# Patient Record
(demographics unavailable — no encounter records)

---

## 2024-12-05 NOTE — DISCUSSION/SUMMARY
[de-identified] : 55-year-old gentleman with bilateral proximal tibia and fibula fractures, approximately 6 months out, treated nonoperatively.   -X-ray and physical exam findings were discussed with the patient.  This is a complicated situation due to the ambulatory status and stature of the legs.  The patient did not walk prior to the fractures.  He used his legs for transfers only.  The bracing for these injuries were causing some skin issues on the right medial aspect of the knee.  Patient does not want cast or surgery.  I explained the possibility of developing a fibrous union at the fracture site where the bone will not be completely healed across the fractures, but the legs could be stable enough for transfers.  We will attempt to have the patient try transfers and assess his comfort level. -Weight bear with bilateral LE for transfers.  If there are issues the patient can come back to the office for evaluation immediately. -Range of motion exercises bilateral knees at the facility and is medically necessary to optimize recovery.  -Follow-up in 3 months with x-rays of bilateral tib/fib and knees -All of the patient's questions and concerns were addressed.

## 2024-12-05 NOTE — PHYSICAL EXAM
[de-identified] : The patient is sitting comfortably in the exam room.  LEFT leg.  -Skin is intact, no swelling, no ecchymosis -Passive range of motion 0-90   -Motion through the fracture site with varus and valgus stress -Foot is warm and well-perfused, palpable dorsalis pedis pulse   The patient is sitting comfortably in the exam room.  RIGHT leg.  -The area that had previous skin breakdown is well-healed.  The skin is fully covering the bony prominence at the medial aspect of the knee.  No erythema and no signs of infection. -Passive range of motion 0-90 -Foot is warm and well-perfused, palpable dorsalis pedis pulse  [de-identified] : X-rays of bilateral knees were taken in the office today.  X-rays show bilateral tricompartmental arthritis.  Both the right and left tibia have fractures at the metaphyseal diaphyseal junction with fibular fractures around the same area.  There is significant overlapping of cortices and overall alignment is reasonable. There is some interval callus formation at the fracture sites compared to previous imaging but the fracture is still visible.  There is more healing and remodeling on the left proximal tibia fracture.  There is some small amount of bridging callus on the right side.   X-rays of the bilateral tibias were taken in the office today, 12/5/24. AP and Lateral.  X-rays show bilateral tibia fractures at the metaphyseal diaphyseal junction.  There is interval healing at the bilateral proximal tibia fracture sites.  The fractures are still visible.  On both tibia there are areas at the distal metaphyseal diaphyseal junction that show previous fracture or previous osteotomy.  These are well-healed and remodeled.

## 2024-12-05 NOTE — HISTORY OF PRESENT ILLNESS
[de-identified] : Mr. LIDIA HOLLIS is a 55 year old gentleman presenting for follow up evaluation of bilateral proximal tibia and fibula fractures sustained on 5/30/24, being treated nonoperatively. Since his last visit he states he is feeling better. He notes medial right knee pain and denies pain to the left lower extremity. He has not put any weight on his bilateral LE for transfers.   Of note, he is wheelchair bound due to spina bifida.

## 2024-12-05 NOTE — DISCUSSION/SUMMARY
[de-identified] : 55-year-old gentleman with bilateral proximal tibia and fibula fractures, approximately 6 months out, treated nonoperatively.   -X-ray and physical exam findings were discussed with the patient.  This is a complicated situation due to the ambulatory status and stature of the legs.  The patient did not walk prior to the fractures.  He used his legs for transfers only.  The bracing for these injuries were causing some skin issues on the right medial aspect of the knee.  Patient does not want cast or surgery.  I explained the possibility of developing a fibrous union at the fracture site where the bone will not be completely healed across the fractures, but the legs could be stable enough for transfers.  We will attempt to have the patient try transfers and assess his comfort level. -Weight bear with bilateral LE for transfers.  If there are issues the patient can come back to the office for evaluation immediately. -Range of motion exercises bilateral knees at the facility and is medically necessary to optimize recovery.  -Follow-up in 3 months with x-rays of bilateral tib/fib and knees -All of the patient's questions and concerns were addressed.

## 2024-12-05 NOTE — REASON FOR VISIT
[Follow-Up Visit] : a follow-up visit for [FreeTextEntry2] : bilateral proximal tibia and fibula fractures

## 2024-12-05 NOTE — HISTORY OF PRESENT ILLNESS
[de-identified] : Mr. LIDIA HOLLIS is a 55 year old gentleman presenting for follow up evaluation of bilateral proximal tibia and fibula fractures sustained on 5/30/24, being treated nonoperatively. Since his last visit he states he is feeling better. He notes medial right knee pain and denies pain to the left lower extremity. He has not put any weight on his bilateral LE for transfers.   Of note, he is wheelchair bound due to spina bifida.

## 2024-12-21 NOTE — PHYSICAL EXAM
[facility]  Office: [phone]  Jay Jeffers DO, Rupert Byrd DO, José Miguel Velasco DO, Waldo Gould DO, Darien Little MD, Brandy Bronson MD, Jose Reyna MD, Candice Mitchell MD,  Uday Banegas MD, Maribeth Lopez MD, Paula Livingston MD,  Armando Naranjo DO, Lino Johnson MD, Zack Lozoya MD, Randolph Jeffers DO, Liza Gabriel MD,  Mango Munroe DO, Katja Warner MD, Liss Bass MD, Elvira Rios MD, Ulysses Taylor MD,  Niko Gage MD, Carla Hilton MD, Monika Calderon MD, Anne Arango MD, Fernando Barros MD, Norberto Gaytan MD, Shubham Vann DO, Myke Mars MD, Shirley Waterhouse, CNP,  Norma Feldman, NAYELI, Shubham Browne, NAYELI,  Kika Kurtz, BIN, Mya Link, CNP, Minnie Stearns, CNP, Bonnie Davis, CNP, Linda Sprague, CNP, Cintia Vasquez PA-C, CHE LondonC, Kalpana Talavera, CNP, Alison Adams, CNP,  Amarilis Manning, CNP, Jenny Alexis, CNP,  Mikayla Pena, CNP, Janelle Pham, CNP         [facility]   IN-PATIENT SERVICE   [facility]    HISTORY AND PHYSICAL EXAMINATION            Date:   12/21/2024  Patient name:  Grabiel Marcelo Jr.  Date of admission:  12/21/2024  7:26 AM  MRN:   [id number]  Account:  [id number]  YOB: 1963  PCP:    Ute Sharma APRN - CNP  Room:   2015/2015-01  Code Status:    Full Code    Chief Complaint:     Chief Complaint   Patient presents with    Abdominal Pain     Patient states abdominal pain started this AM. Pt states hx of cirrhosis. Pt denies nausea, vomiting. Pt states pain is located in his upper abdominal quadrants.         History Obtained From:     patient, spouse, electronic medical record    History of Present Illness:     Grabiel Marcelo Jr. is a 61 y.o. Non- / non  male who presents with Abdominal Pain (Patient states abdominal pain started this AM. Pt states hx of cirrhosis. Pt denies nausea, vomiting. Pt states pain is located in his upper abdominal quadrants.   [de-identified] : The patient is sitting comfortably in the exam room.  LEFT leg.  -Skin is intact, no swelling, no ecchymosis -Passive range of motion 0-90   -Motion through the fracture site with varus and valgus stress -Foot is warm and well-perfused, palpable dorsalis pedis pulse   The patient is sitting comfortably in the exam room.  RIGHT leg.  -The area that had previous skin breakdown is well-healed.  The skin is fully covering the bony prominence at the medial aspect of the knee.  No erythema and no signs of infection. -Passive range of motion 0-90 -Foot is warm and well-perfused, palpable dorsalis pedis pulse  [de-identified] : X-rays of bilateral knees were taken in the office today.  X-rays show bilateral tricompartmental arthritis.  Both the right and left tibia have fractures at the metaphyseal diaphyseal junction with fibular fractures around the same area.  There is significant overlapping of cortices and overall alignment is reasonable. There is some interval callus formation at the fracture sites compared to previous imaging but the fracture is still visible.  There is more healing and remodeling on the left proximal tibia fracture.  There is some small amount of bridging callus on the right side.   X-rays of the bilateral tibias were taken in the office today, 12/5/24. AP and Lateral.  X-rays show bilateral tibia fractures at the metaphyseal diaphyseal junction.  There is interval healing at the bilateral proximal tibia fracture sites.  The fractures are still visible.  On both tibia there are areas at the distal metaphyseal diaphyseal junction that show previous fracture or previous osteotomy.  These are well-healed and remodeled.

## 2025-03-04 NOTE — HISTORY OF PRESENT ILLNESS
[de-identified] : Mr. LIDIA HOLLIS is a 55 year old gentleman presenting for follow up evaluation of bilateral proximal tibia and fibula fractures sustained on 5/30/24, being treated nonoperatively. Since his last visit he states he is feeling   Of note, he is wheelchair bound due to spina bifida.

## 2025-03-04 NOTE — DISCUSSION/SUMMARY
[de-identified] : 55-year-old gentleman with bilateral proximal tibia and fibula fractures, approximately 9 months out, treated nonoperatively.   -X-ray and physical exam findings were discussed with the patient.   -Weight bear with bilateral LE for transfers.  -Range of motion exercises bilateral knees at the facility and is medically necessary to optimize recovery.  -Follow-up in 3 months with x-rays of bilateral tib/fib and knees -All of the patient's questions and concerns were addressed.

## 2025-03-04 NOTE — PHYSICAL EXAM
[de-identified] : The patient is sitting comfortably in the exam room.  LEFT leg.  -Skin is intact, no swelling, no ecchymosis -Passive range of motion 0-90   -Motion through the fracture site with varus and valgus stress -Foot is warm and well-perfused, palpable dorsalis pedis pulse   The patient is sitting comfortably in the exam room.  RIGHT leg.  -The area that had previous skin breakdown is well-healed.  The skin is fully covering the bony prominence at the medial aspect of the knee.  No erythema and no signs of infection. -Passive range of motion 0-90 -Foot is warm and well-perfused, palpable dorsalis pedis pulse  [de-identified] : X-rays of bilateral knees were taken in the office today.  X-rays show bilateral tricompartmental arthritis.  Both the right and left tibia have fractures at the metaphyseal diaphyseal junction with fibular fractures around the same area.  There is significant overlapping of cortices and overall alignment is reasonable. There is some interval callus formation at the fracture sites compared to previous imaging but the fracture is still visible.  There is more healing and remodeling on the left proximal tibia fracture.  There is some small amount of bridging callus on the right side.   X-rays of the bilateral tibias were taken in the office today, 3/6/25. AP and Lateral.  X-rays show bilateral tibia fractures at the metaphyseal diaphyseal junction.  There is interval healing at the bilateral proximal tibia fracture sites.  The fractures are still visible.  On both tibia there are areas at the distal metaphyseal diaphyseal junction that show previous fracture or previous osteotomy.  These are well-healed and remodeled.

## 2025-03-06 NOTE — HISTORY OF PRESENT ILLNESS
[de-identified] : Mr. LIDIA HOLLIS is a 55 year old gentleman presenting for follow up evaluation of bilateral proximal tibia and fibula fractures sustained on 5/30/24, being treated nonoperatively. Since his last visit he states he is feeling better. He is in a rehab facility and bearing weight for transfers. He is also participating in PT daily. He denies pain   Of note, he is wheelchair bound due to spina bifida.

## 2025-03-06 NOTE — DISCUSSION/SUMMARY
[de-identified] : 55-year-old gentleman with bilateral proximal tibia and fibula fractures, approximately 9 months out, treated nonoperatively.   -X-ray and physical exam findings were discussed with the patient.  Patient is feeling much better and his x-rays have improved. -Weight bear with bilateral LE for transfers.  -Activity as tolerated -Range of motion exercises bilateral knees at the facility and is medically necessary to optimize recovery.  -Follow-up as needed with x-rays of bilateral tib/fib and knees -All of the patient's questions and concerns were addressed.

## 2025-03-06 NOTE — PHYSICAL EXAM
[de-identified] : The patient is sitting comfortably in the exam room.  LEFT leg.  -Skin is intact, no swelling, no ecchymosis -Passive range of motion 0-90   -No Motion through the fracture site with varus and valgus stress -Foot is warm and well-perfused, palpable dorsalis pedis pulse   The patient is sitting comfortably in the exam room.  RIGHT leg.  -The area that had previous skin breakdown is well-healed.  The skin is fully covering the bony prominence at the medial aspect of the knee.  No erythema and no signs of infection. -No motion through the fracture site -Passive range of motion 0-90 -Foot is warm and well-perfused, palpable dorsalis pedis pulse  [de-identified] : X-rays of bilateral knees were taken in the office today.  X-rays show bilateral tricompartmental arthritis.  Both the right and left tibia have fractures at the metaphyseal diaphyseal junction with fibular fractures around the same area.  There is significant overlapping of cortices and overall alignment is good. There is some interval callus and remodeling at the fracture sites compared to previous imaging.     X-rays of the bilateral tibias were taken in the office today, 3/6/25. AP and Lateral.  X-rays show bilateral tibia fractures at the metaphyseal diaphyseal junction.  There is interval healing at the bilateral proximal tibia fracture sites.  The fractures look radiographically healed.  On both tibia there are areas at the distal metaphyseal diaphyseal junction that show previous fracture or previous osteotomy.  These are well-healed and remodeled.

## 2025-06-11 NOTE — HISTORY OF PRESENT ILLNESS
[de-identified] : Mr. LIDIA HOLLIS is a 56 year old gentleman presenting for follow up evaluation of bilateral proximal tibia and fibula fractures sustained on 5/30/24, being treated nonoperatively. Since his last visit he states he is feeling better. He has been feeling mild pain at the medial aspect of the right knee.   Of note, he is wheelchair bound due to spina bifida.

## 2025-06-11 NOTE — PHYSICAL EXAM
[de-identified] : The patient is sitting comfortably in the exam room.  LEFT leg.  -Skin is intact, no swelling, no ecchymosis -Passive range of motion 0-90   -No Motion through the fracture site with varus and valgus stress -Foot is warm and well-perfused, palpable dorsalis pedis pulse   The patient is sitting comfortably in the exam room.  RIGHT leg.  -The area that had previous skin breakdown is well-healed.  The skin is fully covering the bony prominence at the medial aspect of the knee.  No erythema and no signs of infection. -No motion through the fracture site -Passive range of motion 0-90 -Foot is warm and well-perfused, palpable dorsalis pedis pulse  [de-identified] : X-rays of bilateral knees were taken in the office today.  X-rays show bilateral tricompartmental arthritis.  Both the right and left tibia have fractures at the metaphyseal diaphyseal junction with fibular fractures around the same area.  There is significant overlapping of cortices and overall alignment is good. There is some interval callus and remodeling at the fracture sites compared to previous imaging.     X-rays of the bilateral tibias were taken in the office today, 6/11/25. AP and Lateral.  X-rays show bilateral tibia fractures at the metaphyseal diaphyseal junction.  There is interval healing at the bilateral proximal tibia fracture sites.  The fractures look radiographically healed.  On both tibia there are areas at the distal metaphyseal diaphyseal junction that show previous fracture or previous osteotomy.  These are well-healed and remodeled.  Xray of the pelvis were taken in the office today, 6/11/25, AP view.

## 2025-06-11 NOTE — HISTORY OF PRESENT ILLNESS
[de-identified] : Mr. LIDIA HOLLIS is a 56 year old gentleman presenting for follow up evaluation of bilateral proximal tibia and fibula fractures sustained on 5/30/24, being treated nonoperatively. Since his last visit he states he is feeling better. He has been feeling mild pain at the medial aspect of the right knee.   Of note, he is wheelchair bound due to spina bifida.

## 2025-06-11 NOTE — DISCUSSION/SUMMARY
[de-identified] : 55-year-old gentleman with bilateral proximal tibia and fibula fractures, approximately 1 year out, treated nonoperatively.   -X-ray and physical exam findings were discussed with the patient.  Patient is feeling much better and his x-rays have improved. -Weight bear with bilateral LE for transfers.  -Activity as tolerated -Range of motion exercises bilateral knees at the facility and is medically necessary to optimize recovery.  -Follow-up as needed with x-rays of bilateral tib/fib and knees -All of the patient's questions and concerns were addressed.

## 2025-06-11 NOTE — PHYSICAL EXAM
[de-identified] : The patient is sitting comfortably in the exam room.  LEFT leg.  -Skin is intact, no swelling, no ecchymosis -Passive range of motion 0-90   -No Motion through the fracture site with varus and valgus stress -Foot is warm and well-perfused, palpable dorsalis pedis pulse   The patient is sitting comfortably in the exam room.  RIGHT leg.  -The area that had previous skin breakdown is well-healed.  The skin is fully covering the bony prominence at the medial aspect of the knee.  No erythema and no signs of infection. -No motion through the fracture site -Passive range of motion 0-90 -Foot is warm and well-perfused, palpable dorsalis pedis pulse  [de-identified] : X-rays of bilateral knees were taken in the office today.  X-rays show bilateral tricompartmental arthritis.  Both the right and left tibia have fractures at the metaphyseal diaphyseal junction with fibular fractures around the same area.  There is significant overlapping of cortices and overall alignment is good. There is some interval callus and remodeling at the fracture sites compared to previous imaging.     X-rays of the bilateral tibias were taken in the office today, 6/11/25. AP and Lateral.  X-rays show bilateral tibia fractures at the metaphyseal diaphyseal junction.  There is interval healing at the bilateral proximal tibia fracture sites.  The fractures look radiographically healed.  On both tibia there are areas at the distal metaphyseal diaphyseal junction that show previous fracture or previous osteotomy.  These are well-healed and remodeled.  Xray of the pelvis were taken in the office today, 6/11/25, AP view.

## 2025-06-11 NOTE — DISCUSSION/SUMMARY
[de-identified] : 55-year-old gentleman with bilateral proximal tibia and fibula fractures, approximately 1 year out, treated nonoperatively.   -X-ray and physical exam findings were discussed with the patient.  Patient is feeling much better and his x-rays have improved. -Weight bear with bilateral LE for transfers.  -Activity as tolerated -Range of motion exercises bilateral knees at the facility and is medically necessary to optimize recovery.  -Follow-up as needed with x-rays of bilateral tib/fib and knees -All of the patient's questions and concerns were addressed.